# Patient Record
Sex: MALE | Race: WHITE | NOT HISPANIC OR LATINO | URBAN - METROPOLITAN AREA
[De-identification: names, ages, dates, MRNs, and addresses within clinical notes are randomized per-mention and may not be internally consistent; named-entity substitution may affect disease eponyms.]

---

## 2023-05-10 PROBLEM — E55.9 VITAMIN D DEFICIENCY: Status: ACTIVE | Noted: 2023-05-10

## 2023-05-10 PROBLEM — E78.5 HLD (HYPERLIPIDEMIA): Status: ACTIVE | Noted: 2023-05-10

## 2023-05-11 ENCOUNTER — OFFICE VISIT (OUTPATIENT)
Dept: PRIMARY CARE | Facility: CLINIC | Age: 38
End: 2023-05-11
Payer: COMMERCIAL

## 2023-05-11 VITALS
DIASTOLIC BLOOD PRESSURE: 68 MMHG | SYSTOLIC BLOOD PRESSURE: 104 MMHG | OXYGEN SATURATION: 97 % | BODY MASS INDEX: 23.56 KG/M2 | RESPIRATION RATE: 18 BRPM | TEMPERATURE: 97.9 F | HEART RATE: 84 BPM | WEIGHT: 189.5 LBS | HEIGHT: 75 IN

## 2023-05-11 DIAGNOSIS — E78.5 HYPERLIPIDEMIA, UNSPECIFIED HYPERLIPIDEMIA TYPE: ICD-10-CM

## 2023-05-11 DIAGNOSIS — M54.2 NECK PAIN: Primary | ICD-10-CM

## 2023-05-11 DIAGNOSIS — E55.9 VITAMIN D DEFICIENCY: ICD-10-CM

## 2023-05-11 PROCEDURE — 99213 OFFICE O/P EST LOW 20 MIN: CPT | Performed by: FAMILY MEDICINE

## 2023-05-11 RX ORDER — MINERAL OIL
180 ENEMA (ML) RECTAL
COMMUNITY

## 2023-05-11 RX ORDER — CYCLOBENZAPRINE HCL 5 MG
5 TABLET ORAL 3 TIMES DAILY PRN
Qty: 30 TABLET | Refills: 2 | Status: SHIPPED | OUTPATIENT
Start: 2023-05-11 | End: 2024-01-06

## 2023-05-11 RX ORDER — PREDNISONE 10 MG/1
TABLET ORAL
Qty: 51 TABLET | Refills: 0 | Status: SHIPPED | OUTPATIENT
Start: 2023-05-11

## 2023-05-11 NOTE — PROGRESS NOTES
Subjective   Patient ID: Valente Martines is a 37 y.o. male who presents for Neck Pain.      HPI  Pt has ongoing neck pain x1 week   Pt states it is at base of neck.  Pt states this has made sleeping very difficulty  Pt has dull constant headache  No pain on turning head side to side.  Pt states hurts when moving head back in nodding motion  Could have been possibly from doing yard work  OTC: Tylenol sometimes ice w/ some relief      Review of Systems  Constitutional:  no chills, no fever and no night sweats.  Eyes: no blurred vision and no eyesight problems.  ENT: no hearing loss, no nasal congestion, no hoarseness and no sore throat.  Neck: no mass (es) and no swelling.  Cardiovascular: no chest pain, no intermittent leg claudication, no lower extremity edema, no palpitation and no syncope.  Respiratory: no cough, no shortness of breath during exertion, no shortness of breath at rest and no wheezing.  Gastrointestinal: no abdominal pain, no blood in stools, no constipation, no diarrhea, no melena, no nausea, no rectal pain and no vomiting.  Genitourinary: no dysuria, no change in urinary frequency, no urinary hesitancy and no feelings of urinary urgency.  Musculoskeletal: no arthralgias, no back pain and no myalgias.  Integumentary: no new skin lesions and no rashes.  Neurological: no difficulty walking, no headache, no limb weakness, no numbness and no tingling.  Psychiatric/Behavioral: no anxiety, no depression, no anhedonia and no substance use disorders.  Endocrine: no recent weight gain and no recent weight loss.  Hematologic/Lymphatic: no tendency for easy bruising and no swollen glands    Objective   Physical Exam  Patient in with complaints of bilateral neck pain no trauma started about a week Sergey ago his pain and tenderness at the base of the skull on the paracervical muscles bilaterally slightly worse on the right side restricted extension otherwise range of motion is normal no paresthesias into the arms.   Physical exam today's office visit constitutional alert and oriented x3.    Head is atraumatic HEENT is within normal limits.    Neck supple no masses full range of motion.    Thyroid is normal in size no thyromegaly there is no carotid bruits.    Pulmonary exam shows clear to auscultation no respiratory distress.    Cardiovascular shows no murmur rub or gallop.  Regular rate and rhythm.    Abdominal exam soft nontender no hepatosplenomegaly or masses normal bowel sounds no rebound no guarding.    Musculoskeletal exam no joint pain no muscle pain full range of motion.    Psych exam normal mood and affect.    Dermatologic exam no skin lesions no rash no blemishes.    Neuro exam is no focal deficits.  Normal exam.    Extremities no edema normal pulses normal capillary refill.    There were no vitals taken for this visit.    Lab Results   Component Value Date    WBC 4.4 10/20/2020    HGB 14.3 10/20/2020    HCT 44.0 10/20/2020    MCV 99 10/20/2020     10/20/2020       Assessment/Plan assessments muscle spasm neck pain plan burst and taper prednisone and Flexeril 5 mg 3 times daily warm water soaks and stretches and follow-up if not improved may need referral to physical therapy.  Problem List Items Addressed This Visit          Endocrine/Metabolic    Vitamin D deficiency       Other    HLD (hyperlipidemia)

## 2024-07-22 ASSESSMENT — PROMIS GLOBAL HEALTH SCALE
RATE_PHYSICAL_HEALTH: GOOD
RATE_AVERAGE_FATIGUE: MODERATE
RATE_GENERAL_HEALTH: GOOD
RATE_MENTAL_HEALTH: GOOD
CARRYOUT_PHYSICAL_ACTIVITIES: COMPLETELY
CARRYOUT_SOCIAL_ACTIVITIES: VERY GOOD
RATE_AVERAGE_PAIN: 3
EMOTIONAL_PROBLEMS: OFTEN
RATE_QUALITY_OF_LIFE: GOOD
RATE_SOCIAL_SATISFACTION: GOOD

## 2024-07-25 ENCOUNTER — OFFICE VISIT (OUTPATIENT)
Dept: PRIMARY CARE | Facility: CLINIC | Age: 39
End: 2024-07-25
Payer: COMMERCIAL

## 2024-07-25 VITALS
TEMPERATURE: 96.6 F | BODY MASS INDEX: 23.23 KG/M2 | WEIGHT: 186.8 LBS | HEART RATE: 71 BPM | SYSTOLIC BLOOD PRESSURE: 108 MMHG | DIASTOLIC BLOOD PRESSURE: 72 MMHG | HEIGHT: 75 IN | OXYGEN SATURATION: 97 %

## 2024-07-25 DIAGNOSIS — Z00.00 ROUTINE GENERAL MEDICAL EXAMINATION AT A HEALTH CARE FACILITY: Primary | ICD-10-CM

## 2024-07-25 DIAGNOSIS — E78.5 HYPERLIPIDEMIA, UNSPECIFIED HYPERLIPIDEMIA TYPE: ICD-10-CM

## 2024-07-25 DIAGNOSIS — E55.9 VITAMIN D DEFICIENCY: ICD-10-CM

## 2024-07-25 PROCEDURE — 1036F TOBACCO NON-USER: CPT | Performed by: FAMILY MEDICINE

## 2024-07-25 PROCEDURE — 99395 PREV VISIT EST AGE 18-39: CPT | Performed by: FAMILY MEDICINE

## 2024-07-25 PROCEDURE — 3008F BODY MASS INDEX DOCD: CPT | Performed by: FAMILY MEDICINE

## 2024-07-25 ASSESSMENT — PATIENT HEALTH QUESTIONNAIRE - PHQ9
2. FEELING DOWN, DEPRESSED OR HOPELESS: NOT AT ALL
SUM OF ALL RESPONSES TO PHQ9 QUESTIONS 1 AND 2: 0
2. FEELING DOWN, DEPRESSED OR HOPELESS: NOT AT ALL
1. LITTLE INTEREST OR PLEASURE IN DOING THINGS: NOT AT ALL
SUM OF ALL RESPONSES TO PHQ9 QUESTIONS 1 AND 2: 0
1. LITTLE INTEREST OR PLEASURE IN DOING THINGS: NOT AT ALL

## 2024-07-25 NOTE — PROGRESS NOTES
Subjective   Patient ID: Valente Martines is a 39 y.o. male who presents for Annual Exam.  HPI      Patient presents today for a physical for being a . Does need form filled out. Is not fasting for blood work. Tries to eat a generally healthy diet. Exercises regularly.        Has no other new problem /question.    Review of Systems  Constitutional:  no chills, no fever and no night sweats.  Eyes: no blurred vision and no eyesight problems.  ENT: no hearing loss, no nasal congestion, no hoarseness and no sore throat.  Neck: no mass (es) and no swelling.  Cardiovascular: no chest pain, no intermittent leg claudication, no lower extremity edema, no palpitation and no syncope.  Respiratory: no cough, no shortness of breath during exertion, no shortness of breath at rest and no wheezing.  Gastrointestinal: no abdominal pain, no blood in stools, no constipation, no diarrhea, no melena, no nausea, no rectal pain and no vomiting.  Genitourinary: no dysuria, no change in urinary frequency, no urinary hesitancy and no feelings of urinary urgency.  Musculoskeletal: no arthralgias, no back pain and no myalgias.  Integumentary: no new skin lesions and no rashes.  Neurological: no difficulty walking, no headache, no limb weakness, no numbness and no tingling.  Psychiatric/Behavioral: no anxiety, no depression, no anhedonia and no substance use disorders.  Endocrine: no recent weight gain and no recent weight loss.  Hematologic/Lymphatic: no tendency for easy bruising and no swollen glands    Objective   Physical Exam  Patient here for annual exam needs paperwork for foster care abductive parents.  They are adopting a 15-month-old child.  He has no chronic medical problems some seasonal allergies that set.  Due for blood work we will get that done while he is here.  Well-developed well-nourished thin muscular male in no acute distress no physical complaints no chest pain or shortness of breath with exertion.  Physical exam  "today's office visit constitutional alert and oriented x3.    Head is atraumatic HEENT is within normal limits.    Neck supple no masses full range of motion.    Thyroid is normal in size no thyromegaly there is no carotid bruits.    Pulmonary exam shows clear to auscultation no respiratory distress.    Cardiovascular shows no murmur rub or gallop.  Regular rate and rhythm.    Abdominal exam soft nontender no hepatosplenomegaly or masses normal bowel sounds no rebound no guarding.    Musculoskeletal exam no joint pain no muscle pain full range of motion.    Psych exam normal mood and affect.    Dermatologic exam no skin lesions no rash no blemishes.    Neuro exam is no focal deficits.  Normal exam.    Extremities no edema normal pulses normal capillary refill.    /72 (BP Location: Right arm, Patient Position: Sitting)   Pulse 71   Temp 35.9 °C (96.6 °F) (Temporal)   Ht 1.905 m (6' 3\")   Wt 84.7 kg (186 lb 12.8 oz)   SpO2 97%   BMI 23.35 kg/m²     Lab Results   Component Value Date    WBC 4.4 10/20/2020    HGB 14.3 10/20/2020    HCT 44.0 10/20/2020    MCV 99 10/20/2020     10/20/2020       Assessment/Plan plan will follow-up with him when we have his blood test results paperwork filled out no restrictions on being on adoptive or .  Problem List Items Addressed This Visit       HLD (hyperlipidemia)    Vitamin D deficiency     Other Visit Diagnoses       Routine general medical examination at a health care facility    -  Primary    BMI 23.0-23.9, adult                "

## 2025-01-08 ENCOUNTER — APPOINTMENT (OUTPATIENT)
Dept: PRIMARY CARE | Facility: CLINIC | Age: 40
End: 2025-01-08
Payer: COMMERCIAL

## 2025-01-09 ENCOUNTER — OFFICE VISIT (OUTPATIENT)
Dept: PRIMARY CARE | Facility: CLINIC | Age: 40
End: 2025-01-09
Payer: COMMERCIAL

## 2025-01-09 VITALS
WEIGHT: 191 LBS | DIASTOLIC BLOOD PRESSURE: 72 MMHG | BODY MASS INDEX: 23.75 KG/M2 | OXYGEN SATURATION: 97 % | SYSTOLIC BLOOD PRESSURE: 120 MMHG | HEIGHT: 75 IN | HEART RATE: 87 BPM | TEMPERATURE: 97.8 F

## 2025-01-09 DIAGNOSIS — K42.9 UMBILICAL HERNIA WITHOUT OBSTRUCTION AND WITHOUT GANGRENE: Primary | ICD-10-CM

## 2025-01-09 PROCEDURE — 99213 OFFICE O/P EST LOW 20 MIN: CPT | Performed by: FAMILY MEDICINE

## 2025-01-09 PROCEDURE — 1036F TOBACCO NON-USER: CPT | Performed by: FAMILY MEDICINE

## 2025-01-09 PROCEDURE — 3008F BODY MASS INDEX DOCD: CPT | Performed by: FAMILY MEDICINE

## 2025-01-09 ASSESSMENT — PATIENT HEALTH QUESTIONNAIRE - PHQ9
1. LITTLE INTEREST OR PLEASURE IN DOING THINGS: NOT AT ALL
SUM OF ALL RESPONSES TO PHQ9 QUESTIONS 1 AND 2: 0
2. FEELING DOWN, DEPRESSED OR HOPELESS: NOT AT ALL

## 2025-01-09 NOTE — PROGRESS NOTES
Subjective   Patient ID: Valente Martines is a 39 y.o. male who presents for Hernia.  HPI  Umbilical cord Hernia  Located belly button   Denies recently lifting heavy objects  Denies a h/o hernias     Pr denies any pain or discomfort. Pr does have an outtie belly button.            Review of Systems  Constitutional:  no chills, no fever and no night sweats.  Eyes: no blurred vision and no eyesight problems.  ENT: no hearing loss, no nasal congestion, no hoarseness and no sore throat.  Neck: no mass (es) and no swelling.  Cardiovascular: no chest pain, no intermittent leg claudication, no lower extremity edema, no palpitation and no syncope.  Respiratory: no cough, no shortness of breath during exertion, no shortness of breath at rest and no wheezing.  Gastrointestinal: no abdominal pain, no blood in stools, no constipation, no diarrhea, no melena, no nausea, no rectal pain and no vomiting.  Genitourinary: no dysuria, no change in urinary frequency, no urinary hesitancy and no feelings of urinary urgency.  Musculoskeletal: no arthralgias, no back pain and no myalgias.  Integumentary: no new skin lesions and no rashes.  Neurological: no difficulty walking, no headache, no limb weakness, no numbness and no tingling.  Psychiatric/Behavioral: no anxiety, no depression, no anhedonia and no substance use disorders.  Endocrine: no recent weight gain and no recent weight loss.  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  Well-developed well-nourished thin muscular male in no acute distress he has a small umbilical hernia barely get the tip of my index finger in the edge of it.  Nontender no persistent fluid easily reducible.  /72   Pulse 87   Temp  "36.6 °C (97.8 °F) (Temporal)   Ht 1.905 m (6' 3\")   Wt 86.6 kg (191 lb)   SpO2 97%   BMI 23.87 kg/m²     Lab Results   Component Value Date    WBC 4.4 10/20/2020    HGB 14.3 10/20/2020    HCT 44.0 10/20/2020    MCV 99 10/20/2020     10/20/2020       Assessment/Plan plan he is concerned that I will enlarge over time will refer to general surgery for possible repair  Problem List Items Addressed This Visit    None  Visit Diagnoses       Umbilical hernia without obstruction and without gangrene    -  Primary    Relevant Orders    Referral to General Surgery            "

## 2025-02-13 ENCOUNTER — APPOINTMENT (OUTPATIENT)
Dept: SURGERY | Facility: CLINIC | Age: 40
End: 2025-02-13
Payer: COMMERCIAL

## 2025-02-13 VITALS
OXYGEN SATURATION: 99 % | HEART RATE: 87 BPM | SYSTOLIC BLOOD PRESSURE: 130 MMHG | DIASTOLIC BLOOD PRESSURE: 87 MMHG | TEMPERATURE: 97.2 F | WEIGHT: 185 LBS | BODY MASS INDEX: 23.12 KG/M2

## 2025-02-13 DIAGNOSIS — K42.9 UMBILICAL HERNIA WITHOUT OBSTRUCTION AND WITHOUT GANGRENE: Primary | ICD-10-CM

## 2025-02-13 PROCEDURE — 99203 OFFICE O/P NEW LOW 30 MIN: CPT | Performed by: SURGERY

## 2025-02-13 PROCEDURE — 1036F TOBACCO NON-USER: CPT | Performed by: SURGERY

## 2025-02-13 NOTE — PROGRESS NOTES
Subjective   Patient ID: Valente Martines is a 39 y.o. male who presents for New Patient Visit (Umb hernia).  Complaints and the nonreducible lump in umbilical area.  Patient developed the symptoms over the last month.  There is a increased pain, development of some skin discoloration  HPI as described above  Review of Systems integumentary consistent with nonreducible lump in the umbilical area.  Review of all other 10 system is negative.  Physical Exam pupils equal bilaterally, mucosa moist, bilateral breath sounds, regular rhythm and rate, abdomen soft, tender in the umbilical area.  Palpable nonreducible umbilical hernia 3.2 cm.  Palpable peripheral pulse, no focal neurological motor deficits.  Musculoskeletal exam within normal limits, ENT exam within normal limits.    Objective   I reviewed all available data including lab results, radiological studies, previous reports and notes.  Encounter Diagnosis   Name Primary?    Umbilical hernia without obstruction and without gangrene       Patient Active Problem List   Diagnosis    HLD (hyperlipidemia)    Vitamin D deficiency      No Known Allergies   Medication Documentation Review Audit       Reviewed by Iftikhar Flores MD (Physician) on 02/13/25 at 1149      Medication Order Taking? Sig Documenting Provider Last Dose Status   fexofenadine (Allegra) 180 mg tablet [id number] No Take 1 tablet (180 mg) by mouth once daily.   Patient not taking: Reported on 2/13/2025    Historical Provider, MD Taking Active   predniSONE (Deltasone) 10 mg tablet [id number]  Take 60 mg a day for 6 days.  Then decrease by 10 mg a day until gone   Patient not taking: Reported on 1/9/2025    Tyrone Conklin MD  Active                    History reviewed. No pertinent past medical history.  Social History     Tobacco Use   Smoking Status Never   Smokeless Tobacco Never     Family History   Problem Relation Name Age of Onset    Hypertension Mother      Hyperlipidemia Mother      Breast cancer  Mother      Basal cell carcinoma Father        Past Surgical History:   Procedure Laterality Date    OTHER SURGICAL HISTORY  10/20/2020    Eye surgery       Assessment/Plan   With incarcerated umbilical hernia 3.2 cm.  The patient has indication for laparoscopic, possible open incarcerated umbilical hernia repair.  Risks, benefits, alternative treatment were explained to the patient.  All questions were answered.  Informed consent was obtained.      Iftikhar Flores MD

## 2025-02-19 ENCOUNTER — APPOINTMENT (OUTPATIENT)
Dept: PRIMARY CARE | Facility: CLINIC | Age: 40
End: 2025-02-19
Payer: COMMERCIAL

## 2025-02-19 VITALS
DIASTOLIC BLOOD PRESSURE: 78 MMHG | SYSTOLIC BLOOD PRESSURE: 126 MMHG | HEART RATE: 78 BPM | TEMPERATURE: 97.7 F | WEIGHT: 193 LBS | BODY MASS INDEX: 24 KG/M2 | OXYGEN SATURATION: 97 % | HEIGHT: 75 IN

## 2025-02-19 DIAGNOSIS — K42.9 UMBILICAL HERNIA WITHOUT OBSTRUCTION AND WITHOUT GANGRENE: Primary | ICD-10-CM

## 2025-02-19 DIAGNOSIS — R30.0 DYSURIA: ICD-10-CM

## 2025-02-19 DIAGNOSIS — M79.609 PAIN IN EXTREMITY, UNSPECIFIED EXTREMITY: ICD-10-CM

## 2025-02-19 DIAGNOSIS — Z01.818 PRE-OP EVALUATION: ICD-10-CM

## 2025-02-19 DIAGNOSIS — E78.5 HYPERLIPIDEMIA, UNSPECIFIED HYPERLIPIDEMIA TYPE: ICD-10-CM

## 2025-02-19 PROCEDURE — 3008F BODY MASS INDEX DOCD: CPT | Performed by: FAMILY MEDICINE

## 2025-02-19 PROCEDURE — 99213 OFFICE O/P EST LOW 20 MIN: CPT | Performed by: FAMILY MEDICINE

## 2025-02-19 PROCEDURE — 1036F TOBACCO NON-USER: CPT | Performed by: FAMILY MEDICINE

## 2025-02-19 PROCEDURE — 93000 ELECTROCARDIOGRAM COMPLETE: CPT | Performed by: FAMILY MEDICINE

## 2025-02-19 ASSESSMENT — PATIENT HEALTH QUESTIONNAIRE - PHQ9
2. FEELING DOWN, DEPRESSED OR HOPELESS: NOT AT ALL
1. LITTLE INTEREST OR PLEASURE IN DOING THINGS: NOT AT ALL
SUM OF ALL RESPONSES TO PHQ9 QUESTIONS 1 AND 2: 0

## 2025-02-19 NOTE — PROGRESS NOTES
"Subjective   Patient ID: Valente Martines is a 39 y.o. male who presents for Hernia.  HPI    Patient presents in the office today for surgical clearance. Patient is having a umbilical hernia repair done on 2/25/25 with Dr. Flores at [facility].       Review of Systems  Constitutional:  no chills, no fever and no night sweats.  Eyes: no blurred vision and no eyesight problems.  ENT: no hearing loss, no nasal congestion, no hoarseness and no sore throat.  Neck: no mass (es) and no swelling.  Cardiovascular: no chest pain, no intermittent leg claudication, no lower extremity edema, no palpitation and no syncope.  Respiratory: no cough, no shortness of breath during exertion, no shortness of breath at rest and no wheezing.  Gastrointestinal: no abdominal pain, no blood in stools, no constipation, no diarrhea, no melena, no nausea, no rectal pain and no vomiting.  Genitourinary: no dysuria, no change in urinary frequency, no urinary hesitancy and no feelings of urinary urgency.  Musculoskeletal: no arthralgias, no back pain and no myalgias.  Integumentary: no new skin lesions and no rashes.  Neurological: no difficulty walking, no headache, no limb weakness, no numbness and no tingling.  Psychiatric/Behavioral: no anxiety, no depression, no anhedonia and no substance use disorders.  Endocrine: no recent weight gain and no recent weight loss.  Hematologic/Lymphatic: no tendency for easy bruising and no swollen glands    Objective   Physical Exam  Patient in for PAT blood work for umbilical hernia repair at [facility] on 25 February.  Well-developed well-nourished thin muscular male in no acute distress umbilical hernia unchanged lungs clear cardiac and abdominal exams are benign.  No peripheral edema.  Denies chest pain or shortness of breath with exertion.  /78 (BP Location: Left arm, Patient Position: Sitting)   Pulse 78   Temp 36.5 °C (97.7 °F) (Temporal)   Ht 1.905 m (6' 3\")   Wt " 87.5 kg (193 lb)   SpO2 97%   BMI 24.12 kg/m²     Lab Results   Component Value Date    WBC 4.4 10/20/2020    HGB 14.3 10/20/2020    HCT 44.0 10/20/2020    MCV 99 10/20/2020     10/20/2020       Assessment/Plan plans get blood drawn he is otherwise cleared for the surgery.  Problem List Items Addressed This Visit       HLD (hyperlipidemia)     Other Visit Diagnoses       Umbilical hernia without obstruction and without gangrene    -  Primary    Pain in extremity, unspecified extremity        Pre-op evaluation        Relevant Orders    Staphylococcus aureus/MRSA colonization, Culture    ECG 12 lead (Clinic Performed)    Dysuria

## 2025-02-22 LAB
ALBUMIN SERPL-MCNC: 4.6 G/DL (ref 3.6–5.1)
ALP SERPL-CCNC: 51 U/L (ref 36–130)
ALT SERPL-CCNC: 33 U/L (ref 9–46)
ANION GAP SERPL CALCULATED.4IONS-SCNC: 8 MMOL/L (CALC) (ref 7–17)
APPEARANCE UR: CLEAR
APTT PPP: 29 SEC (ref 23–32)
AST SERPL-CCNC: 22 U/L (ref 10–40)
BACTERIA #/AREA URNS HPF: ABNORMAL /HPF
BACTERIA UR CULT: NORMAL
BASOPHILS # BLD AUTO: 21 CELLS/UL (ref 0–200)
BASOPHILS NFR BLD AUTO: 0.4 %
BILIRUB SERPL-MCNC: 0.4 MG/DL (ref 0.2–1.2)
BILIRUB UR QL STRIP: NEGATIVE
BUN SERPL-MCNC: 16 MG/DL (ref 7–25)
CALCIUM SERPL-MCNC: 9.3 MG/DL (ref 8.6–10.3)
CHLORIDE SERPL-SCNC: 103 MMOL/L (ref 98–110)
CHOLEST SERPL-MCNC: 301 MG/DL
CHOLEST/HDLC SERPL: 6.1 (CALC)
CO2 SERPL-SCNC: 30 MMOL/L (ref 20–32)
COLOR UR: YELLOW
CREAT SERPL-MCNC: 0.95 MG/DL (ref 0.6–1.26)
EGFRCR SERPLBLD CKD-EPI 2021: 104 ML/MIN/1.73M2
EOSINOPHIL # BLD AUTO: 140 CELLS/UL (ref 15–500)
EOSINOPHIL NFR BLD AUTO: 2.7 %
ERYTHROCYTE [DISTWIDTH] IN BLOOD BY AUTOMATED COUNT: 11.6 % (ref 11–15)
GLUCOSE SERPL-MCNC: 103 MG/DL (ref 65–99)
GLUCOSE UR QL STRIP: NEGATIVE
HCT VFR BLD AUTO: 42.8 % (ref 38.5–50)
HDLC SERPL-MCNC: 49 MG/DL
HGB BLD-MCNC: 14.7 G/DL (ref 13.2–17.1)
HGB UR QL STRIP: ABNORMAL
HYALINE CASTS #/AREA URNS LPF: ABNORMAL /LPF
INR PPP: 1
KETONES UR QL STRIP: NEGATIVE
LDLC SERPL CALC-MCNC: 229 MG/DL (CALC)
LEUKOCYTE ESTERASE UR QL STRIP: NEGATIVE
LYMPHOCYTES # BLD AUTO: 2080 CELLS/UL (ref 850–3900)
LYMPHOCYTES NFR BLD AUTO: 40 %
MCH RBC QN AUTO: 33.1 PG (ref 27–33)
MCHC RBC AUTO-ENTMCNC: 34.3 G/DL (ref 32–36)
MCV RBC AUTO: 96.4 FL (ref 80–100)
MONOCYTES # BLD AUTO: 343 CELLS/UL (ref 200–950)
MONOCYTES NFR BLD AUTO: 6.6 %
NEUTROPHILS # BLD AUTO: 2616 CELLS/UL (ref 1500–7800)
NEUTROPHILS NFR BLD AUTO: 50.3 %
NITRITE UR QL STRIP: NEGATIVE
NONHDLC SERPL-MCNC: 252 MG/DL (CALC)
PH UR STRIP: 6 [PH] (ref 5–8)
PLATELET # BLD AUTO: 236 THOUSAND/UL (ref 140–400)
PMV BLD REES-ECKER: 10.4 FL (ref 7.5–12.5)
POTASSIUM SERPL-SCNC: 3.7 MMOL/L (ref 3.5–5.3)
PROT SERPL-MCNC: 7.4 G/DL (ref 6.1–8.1)
PROT UR QL STRIP: NEGATIVE
PROTHROMBIN TIME: 10.8 SEC (ref 9–11.5)
RBC # BLD AUTO: 4.44 MILLION/UL (ref 4.2–5.8)
RBC #/AREA URNS HPF: ABNORMAL /HPF
SERVICE CMNT-IMP: ABNORMAL
SODIUM SERPL-SCNC: 141 MMOL/L (ref 135–146)
SP GR UR STRIP: 1.03 (ref 1–1.03)
SQUAMOUS #/AREA URNS HPF: ABNORMAL /HPF
TRIGL SERPL-MCNC: 102 MG/DL
WBC # BLD AUTO: 5.2 THOUSAND/UL (ref 3.8–10.8)
WBC #/AREA URNS HPF: ABNORMAL /HPF

## 2025-02-23 LAB — MRSA SPEC QL CULT: ABNORMAL

## 2025-02-24 ENCOUNTER — TELEPHONE (OUTPATIENT)
Dept: PRIMARY CARE | Facility: CLINIC | Age: 40
End: 2025-02-24
Payer: COMMERCIAL

## 2025-02-24 NOTE — TELEPHONE ENCOUNTER
----- Message from Tyrone Conklin sent at 2/24/2025  2:13 PM EST -----  Clinic cholesterol levels markedly elevated recommend starting 20 mg of Crestor and repeat a fasting lipid panel in 4 months.  The rest of the labs are normal he is cleared for the surgery

## 2025-03-17 ENCOUNTER — APPOINTMENT (OUTPATIENT)
Dept: SURGERY | Facility: CLINIC | Age: 40
End: 2025-03-17
Payer: COMMERCIAL

## 2025-03-17 DIAGNOSIS — K42.9 UMBILICAL HERNIA WITHOUT OBSTRUCTION AND WITHOUT GANGRENE: Primary | ICD-10-CM

## 2025-03-17 PROCEDURE — 99213 OFFICE O/P EST LOW 20 MIN: CPT | Performed by: SURGERY

## 2025-03-17 PROCEDURE — 1036F TOBACCO NON-USER: CPT | Performed by: SURGERY

## 2025-03-17 NOTE — PROGRESS NOTES
Subjective   Patient ID: Valente Martines is a 39 y.o. male who presents for Post-op (HERNIA).  The patient has no complaints  HPI history of umbilical hernia repair.  Review of Systems review of all 14 system is negative  Physical Exam physical bilaterally, mucosa moist, bilateral breath sounds, regular rate, abdomen soft, nontender.  Incision healed by the primary intention.  No evidence of hernia recurrence.  No focal neurological motor deficits  Objective I reviewed all available data including lab results, radiological studies, previous reports and notes.  Pathology from omentectomy showed no evidence of neoplastic process    No diagnosis found.   Patient Active Problem List   Diagnosis    HLD (hyperlipidemia)    Vitamin D deficiency      No Known Allergies   Medication Documentation Review Audit       Reviewed by Iftikhar Flores MD (Physician) on 03/17/25 at 1731      Medication Order Taking? Sig Documenting Provider Last Dose Status   fexofenadine (Allegra) 180 mg tablet [id number] Yes Take 1 tablet (180 mg) by mouth once daily. Historical Provider, MD Taking Active   predniSONE (Deltasone) 10 mg tablet [id number]  Take 60 mg a day for 6 days.  Then decrease by 10 mg a day until gone Tyrone Conklin MD  Active                    History reviewed. No pertinent past medical history.  Social History     Tobacco Use   Smoking Status Never   Smokeless Tobacco Never     Family History   Problem Relation Name Age of Onset    Hypertension Mother      Hyperlipidemia Mother      Breast cancer Mother      Basal cell carcinoma Father        Past Surgical History:   Procedure Laterality Date    OTHER SURGICAL HISTORY  10/20/2020    Eye surgery       Assessment/Plan   Status post umbilical hernia repair.  No evidence of surgical complications, follow-up as needed    Iftikhar Flores MD